# Patient Record
Sex: MALE | Race: WHITE | Employment: FULL TIME | URBAN - METROPOLITAN AREA
[De-identification: names, ages, dates, MRNs, and addresses within clinical notes are randomized per-mention and may not be internally consistent; named-entity substitution may affect disease eponyms.]

---

## 2022-12-13 ENCOUNTER — TELEPHONE (OUTPATIENT)
Dept: INTERNAL MEDICINE CLINIC | Age: 57
End: 2022-12-13

## 2022-12-13 DIAGNOSIS — Z00.00 PHYSICAL EXAM: Primary | ICD-10-CM

## 2023-01-14 PROBLEM — N52.9 ED (ERECTILE DYSFUNCTION): Status: ACTIVE | Noted: 2023-01-14

## 2023-01-14 PROBLEM — K21.9 GERD (GASTROESOPHAGEAL REFLUX DISEASE): Status: ACTIVE | Noted: 2023-01-14

## 2023-01-14 PROBLEM — E78.5 HYPERLIPIDEMIA: Status: ACTIVE | Noted: 2023-01-14

## 2023-01-16 ENCOUNTER — TELEPHONE (OUTPATIENT)
Dept: INTERNAL MEDICINE CLINIC | Age: 58
End: 2023-01-16

## 2023-01-16 ENCOUNTER — APPOINTMENT (OUTPATIENT)
Dept: INTERNAL MEDICINE CLINIC | Age: 58
End: 2023-01-16

## 2023-01-16 LAB
ERYTHROCYTE [DISTWIDTH] IN BLOOD BY AUTOMATED COUNT: 12.9 % (ref 10–15.5)
HCT VFR BLD AUTO: 45.5 % (ref 39.3–51.6)
HGB BLD-MCNC: 14.3 G/DL (ref 13.1–17.2)
MCH RBC QN AUTO: 29 PG (ref 26–34)
MCHC RBC AUTO-ENTMCNC: 31 G/DL (ref 31–36)
MCV RBC AUTO: 92 FL (ref 80–95)
PLATELET # BLD AUTO: 243 K/UL (ref 140–440)
PMV BLD AUTO: 11.8 FL (ref 9–13)
RBC # BLD AUTO: 4.95 M/UL (ref 3.8–5.8)
WBC # BLD AUTO: 9.4 K/UL (ref 4–11)

## 2023-01-16 NOTE — TELEPHONE ENCOUNTER
Spoke with  at Dr. Jodie Rodriguez office she will send medical records. Spoke with Dr. Kristine Oropeza records department. Per Medical Records Rep patient will have to sign a release of information before records will be released. My chart message sent to patient.

## 2023-01-16 NOTE — TELEPHONE ENCOUNTER
----- Message from Shiv Wild MD sent at 1/14/2023 10:27 PM EST -----  Pls get [facility] records dr Vance Quezada get records dr Martell Rucker

## 2023-01-17 LAB
A-G RATIO,AGRAT: 1.6 RATIO (ref 1.1–2.6)
ALBUMIN SERPL-MCNC: 4.5 G/DL (ref 3.5–5)
ALP SERPL-CCNC: 84 U/L (ref 25–115)
ALT SERPL-CCNC: 17 U/L (ref 5–40)
ANION GAP SERPL CALC-SCNC: 12 MMOL/L (ref 3–15)
AST SERPL W P-5'-P-CCNC: 19 U/L (ref 10–37)
AVG GLU, 10930: 113 MG/DL (ref 91–123)
BILIRUB SERPL-MCNC: 0.8 MG/DL (ref 0.2–1.2)
BUN SERPL-MCNC: 16 MG/DL (ref 6–22)
CALCIUM SERPL-MCNC: 9.6 MG/DL (ref 8.4–10.5)
CHLORIDE SERPL-SCNC: 100 MMOL/L (ref 98–110)
CHOLEST SERPL-MCNC: 141 MG/DL (ref 110–200)
CO2 SERPL-SCNC: 27 MMOL/L (ref 20–32)
CREAT SERPL-MCNC: 1.2 MG/DL (ref 0.5–1.2)
GLOBULIN,GLOB: 2.9 G/DL (ref 2–4)
GLOMERULAR FILTRATION RATE: >60 ML/MIN/1.73 SQ.M.
GLUCOSE SERPL-MCNC: 93 MG/DL (ref 70–99)
HBA1C MFR BLD HPLC: 5.6 % (ref 4.8–5.6)
HDLC SERPL-MCNC: 3.7 MG/DL (ref 0–5)
HDLC SERPL-MCNC: 38 MG/DL
LDL/HDL RATIO,LDHD: 2
LDLC SERPL CALC-MCNC: 76 MG/DL (ref 50–99)
NON-HDL CHOLESTEROL, 011976: 103 MG/DL
POTASSIUM SERPL-SCNC: 4.1 MMOL/L (ref 3.5–5.5)
PROT SERPL-MCNC: 7.4 G/DL (ref 6.4–8.3)
PSA SERPL-MCNC: 2.27 NG/ML
SODIUM SERPL-SCNC: 139 MMOL/L (ref 133–145)
TRIGL SERPL-MCNC: 134 MG/DL (ref 40–149)
VLDLC SERPL CALC-MCNC: 27 MG/DL (ref 8–30)

## 2023-01-19 PROBLEM — F98.8 ADD (ATTENTION DEFICIT DISORDER): Status: ACTIVE | Noted: 2023-01-19

## 2023-01-19 PROBLEM — I10 PRIMARY HYPERTENSION: Status: ACTIVE | Noted: 2023-01-19

## 2023-01-19 PROBLEM — J30.9 ALLERGIC RHINITIS: Status: RESOLVED | Noted: 2023-01-19 | Resolved: 2023-01-19

## 2023-01-19 PROBLEM — J30.9 ALLERGIC RHINITIS: Status: ACTIVE | Noted: 2023-01-19

## 2023-01-19 PROBLEM — F98.8 ADD (ATTENTION DEFICIT DISORDER): Status: RESOLVED | Noted: 2023-01-19 | Resolved: 2023-01-19

## 2023-01-19 PROBLEM — E78.5 DYSLIPIDEMIA: Status: ACTIVE | Noted: 2023-01-14

## 2023-01-20 NOTE — PROGRESS NOTES
59 y.o. male who presents for evaluation. He is transferring care from Dr Gonzales [facility]. Records obtained and reviewed from several sources, chart updated    No cardiovascular complaints. Remains active with swimming, biking, weights, golfing. No gi or gu issues    Past Medical History:   Diagnosis Date    Acquired bilateral renal cysts     ADD (attention deficit disorder)     Allergic rhinitis     Colon adenomas 07/2014    and hyperplastic Dr Garrison Fung    Dyslipidemia     ED (erectile dysfunction)     GERD (gastroesophageal reflux disease) 2003    egd Dr Garrison Fung    H/O cardiovascular stress test     NST, low risk. EF 59%.  (7/16) Dr Stanislav Gonzalez    Hypertension     Insomnia     PLMD (periodic limb movement disorder)     [facility] 12[facility]2014     Past Surgical History:   Procedure Laterality Date    HX COLONOSCOPY      Dr Garrison Fung (12/5/14) adenoma and ileitis; (9/11/20) neg    HX GI      Dr Debo Menjivar (2003); (2/15); (9/20)    HX HEENT  2003    mandible osteotomy [facility]    HX ORTHOPAEDIC      DEXA t score 0.3 heel (2009)     Social History     Socioeconomic History    Marital status:      Spouse name: Not on file    Number of children: 3    Years of education: Not on file    Highest education level: Not on file   Occupational History    Occupation: orthodontist   Tobacco Use    Smoking status: Never    Smokeless tobacco: Former   Substance and Sexual Activity    Alcohol use: Yes     Comment: social/wine    Drug use: No    Sexual activity: Not on file   Other Topics Concern    Not on file   Social History Narrative    Not on file     Social Determinants of Health     Financial Resource Strain: Not on file   Food Insecurity: Not on file   Transportation Needs: Not on file   Physical Activity: Not on file   Stress: Not on file   Social Connections: Not on file   Intimate Partner Violence: Not on file   Housing Stability: Not on file     Family History   Problem Relation Age of Onset Cancer Mother         lung    Cancer Father         prostate    Heart Disease Father         CABG    Heart Surgery Father         [address] (age 39/50)     Immunization History   Administered Date(s) Administered    Influenza Vaccine 11/30/2012, 10/07/2016, 09/08/2017    Tdap 06/27/2009    Zoster Vaccine, Live 05/30/2014     There were no vitals taken for this visit. A&O x3  Affect is appropriate. Mood stable  No apparent distress  Anicteric, no JVD, adenopathy or thyromegaly. No carotid bruits or radiated murmur  Lungs clear to auscultation, no wheezes or rales  Heart showed regular rate and rhythm. No murmur, rubs, gallops  Abdomen soft nontender, no hepatosplenomegaly or masses. Extremities without edema. Pulses 1-2+ symmetrically    LABS  From 3/18 showed glu 94, cr 1.23, gfr 66, alt 20, hba1c 5.4, chol 146, tg 132, hdl 37, ldl-c 82, wbc 8.4, hb 15.2, plt 259, psa 1.40, vit d 32.4, ua neg, tsh 1.75, test 456, uric 7.8    Results for orders placed or performed in visit on 01/16/23   LIPID PANEL   Result Value Ref Range    Triglyceride 134 40 - 149 mg/dL    HDL Cholesterol 38 (L) >=40 mg/dL    Cholesterol, total 141 110 - 200 mg/dL    CHOLESTEROL/HDL 3.7 0.0 - 5.0    Non-HDL Cholesterol 103 <130 mg/dL    LDL, calculated 76 50 - 99 mg/dL    VLDL, calculated 27 8 - 30 mg/dL    LDL/HDL Ratio 2.0    METABOLIC PANEL, COMPREHENSIVE   Result Value Ref Range    Glucose 93 70 - 99 mg/dL    BUN 16 6 - 22 mg/dL    Creatinine 1.2 0.5 - 1.2 mg/dL    Sodium 139 133 - 145 mmol/L    Potassium 4.1 3.5 - 5.5 mmol/L    Chloride 100 98 - 110 mmol/L    CO2 27 20 - 32 mmol/L    AST (SGOT) 19 10 - 37 U/L    ALT (SGPT) 17 5 - 40 U/L    Alk.  phosphatase 84 25 - 115 U/L    Bilirubin, total 0.8 0.2 - 1.2 mg/dL    Calcium 9.6 8.4 - 10.5 mg/dL    Protein, total 7.4 6.4 - 8.3 g/dL    Albumin 4.5 3.5 - 5.0 g/dL    A-G Ratio 1.6 1.1 - 2.6 ratio    Globulin 2.9 2.0 - 4.0 g/dL    GLOMERULAR FILTRATION RATE >60.0 >60.0 mL/min/1.73 sq.m.    Anion gap 12.0 3.0 - 15.0 mmol/L   PSA SCREENING (SCREENING)   Result Value Ref Range    Prostate Specific Ag 2.270 <=3.100 ng/mL   CBC W/O DIFF   Result Value Ref Range    WBC 9.4 4.0 - 11.0 K/uL    RBC 4.95 3.80 - 5.80 M/uL    HGB 14.3 13.1 - 17.2 g/dL    HCT 45.5 39.3 - 51.6 %    MCV 92 80 - 95 fL    MCH 29 26 - 34 pg    MCHC 31 31 - 36 g/dL    RDW 12.9 10.0 - 15.5 %    PLATELET 868 779 - 546 K/uL    MPV 11.8 9.0 - 13.0 fL   HEMOGLOBIN A1C W/O EAG   Result Value Ref Range    Hemoglobin A1c 5.6 4.8 - 5.6 %    AVG  91 - 123 mg/dL 9.4 4.0 - 11.0 K/uL    RBC 4.95 3.80 - 5.80 M/uL    HGB 14.3 13.1 - 17.2 g/dL    HCT 45.5 39.3 - 51.6 %    MCV 92 80 - 95 fL    MCH 29 26 - 34 pg    MCHC 31 31 - 36 g/dL    RDW 12.9 10.0 - 15.5 %    PLATELET 857 168 - 125 K/uL    MPV 11.8 9.0 - 13.0 fL   HEMOGLOBIN A1C W/O EAG   Result Value Ref Range    Hemoglobin A1c 5.6 4.8 - 5.6 %    AVG  91 - 123 mg/dL     We reviewed the patient's labs from the last several visits to point out trends in the numbers    Patient Active Problem List   Diagnosis Code    ED (erectile dysfunction) N52.9    GERD (gastroesophageal reflux disease) K21.9    Dyslipidemia E78.5    Primary hypertension I10    Hypothyroidism E03.9    BPH (benign prostatic hyperplasia) N40.0    IGT (impaired glucose tolerance) R73.02    YAZAN (obstructive sleep apnea) G47.33    Overweight (BMI 25.0-29. 9) E66.3     Assessment and plan:  1.  ED. Prn pde5i  2. GERD. Ppi and avoidance measures  3. Dyslipidemia  Lifestyle and dietary measures. Continue statin  4. Hypertension. Continue current regimen. 5.  Hypothyroidism. Will get records Dr Starla Denver, continue replacement  6. BPH and h/o elevated psa. Per NP [facility]  7. IGT. Lifestyle and dietary measures. Portion control reiterated and wt loss as he is trying to do  8. YAZAN  dental appliance and follow up Dr Tomasa Valencia  9. Overweight. As above      RTC 1/24    Instructions above were handwritten on the lab results sheet that I gave the patient today    Above conditions discussed at length and patient vocalized understanding. All questions answered to patient satisfaction        ICD-10-CM ICD-9-CM    1. Dyslipidemia  E78.5 272.4 LIPID PANEL      2. Erectile dysfunction, unspecified erectile dysfunction type  N52.9 607.84 tadalafiL (CIALIS) 20 mg tablet      3. Gastroesophageal reflux disease without esophagitis  K21.9 530.81 pantoprazole (PROTONIX) 20 mg tablet      4. Primary hypertension  I10 401.9 CBC W/O DIFF      5.  Acquired hypothyroidism E03.9 244.9 TSH 3RD GENERATION      T4, FREE      6. Benign prostatic hyperplasia without lower urinary tract symptoms  N40.0 600.00       7. IGT (impaired glucose tolerance)  P22.52 503.42 METABOLIC PANEL, COMPREHENSIVE      HEMOGLOBIN A1C WITH EAG      8. YAZAN (obstructive sleep apnea)  G47.33 327.23       9. Overweight (BMI 25.0-29. 9)  E66.3 278.02

## 2023-01-24 NOTE — PROGRESS NOTES
Dany Rosario presents today for   Chief Complaint   Patient presents with    New Patient    Labs     1-16-23     1. \"Have you been to the ER, urgent care clinic since your last visit? Hospitalized since your last visit? \" no    2. \"Have you seen or consulted any other health care providers outside of the [address] [place] [address] since your last visit? \" no     3. For patients aged 39-70: Has the patient had a colonoscopy / FIT/ Cologuard? Yes - no Care Gap present      If the patient is female:    4. For patients aged 41-77: Has the patient had a mammogram within the past 2 years? NA - based on age or sex  See top three    5. For patients aged 21-65: Has the patient had a pap smear?  NA - based on age or sex

## 2023-01-27 ENCOUNTER — TELEPHONE (OUTPATIENT)
Dept: INTERNAL MEDICINE CLINIC | Age: 58
End: 2023-01-27

## 2023-01-27 ENCOUNTER — OFFICE VISIT (OUTPATIENT)
Dept: INTERNAL MEDICINE CLINIC | Age: 58
End: 2023-01-27
Payer: COMMERCIAL

## 2023-01-27 VITALS
RESPIRATION RATE: 19 BRPM | WEIGHT: 195 LBS | HEART RATE: 63 BPM | SYSTOLIC BLOOD PRESSURE: 136 MMHG | DIASTOLIC BLOOD PRESSURE: 81 MMHG | BODY MASS INDEX: 27.3 KG/M2 | HEIGHT: 71 IN | TEMPERATURE: 97.1 F | OXYGEN SATURATION: 99 %

## 2023-01-27 DIAGNOSIS — N40.0 BENIGN PROSTATIC HYPERPLASIA WITHOUT LOWER URINARY TRACT SYMPTOMS: ICD-10-CM

## 2023-01-27 DIAGNOSIS — G47.33 OSA (OBSTRUCTIVE SLEEP APNEA): ICD-10-CM

## 2023-01-27 DIAGNOSIS — R73.02 IGT (IMPAIRED GLUCOSE TOLERANCE): ICD-10-CM

## 2023-01-27 DIAGNOSIS — E66.3 OVERWEIGHT (BMI 25.0-29.9): ICD-10-CM

## 2023-01-27 DIAGNOSIS — N52.9 ERECTILE DYSFUNCTION, UNSPECIFIED ERECTILE DYSFUNCTION TYPE: ICD-10-CM

## 2023-01-27 DIAGNOSIS — K21.9 GASTROESOPHAGEAL REFLUX DISEASE WITHOUT ESOPHAGITIS: ICD-10-CM

## 2023-01-27 DIAGNOSIS — E78.5 DYSLIPIDEMIA: ICD-10-CM

## 2023-01-27 DIAGNOSIS — E03.9 ACQUIRED HYPOTHYROIDISM: ICD-10-CM

## 2023-01-27 DIAGNOSIS — I10 PRIMARY HYPERTENSION: ICD-10-CM

## 2023-01-27 PROCEDURE — 3074F SYST BP LT 130 MM HG: CPT | Performed by: INTERNAL MEDICINE

## 2023-01-27 PROCEDURE — 99205 OFFICE O/P NEW HI 60 MIN: CPT | Performed by: INTERNAL MEDICINE

## 2023-01-27 PROCEDURE — 3078F DIAST BP <80 MM HG: CPT | Performed by: INTERNAL MEDICINE

## 2023-01-27 RX ORDER — ATORVASTATIN CALCIUM 20 MG/1
20 TABLET, FILM COATED ORAL
COMMUNITY
Start: 2023-01-12 | End: 2023-01-27 | Stop reason: SDUPTHER

## 2023-01-27 RX ORDER — HYDROCHLOROTHIAZIDE 12.5 MG/1
12.5 TABLET ORAL DAILY
COMMUNITY
Start: 2023-01-09 | End: 2023-01-27 | Stop reason: SDUPTHER

## 2023-01-27 RX ORDER — HYDROCHLOROTHIAZIDE 12.5 MG/1
12.5 TABLET ORAL DAILY
Qty: 90 TABLET | Refills: 3 | Status: SHIPPED | OUTPATIENT
Start: 2023-01-27

## 2023-01-27 RX ORDER — ATORVASTATIN CALCIUM 20 MG/1
20 TABLET, FILM COATED ORAL
Qty: 90 TABLET | Refills: 3 | Status: SHIPPED | OUTPATIENT
Start: 2023-01-27

## 2023-01-27 RX ORDER — TADALAFIL 20 MG/1
10 TABLET ORAL AS NEEDED
Qty: 30 TABLET | Refills: 3 | Status: SHIPPED | OUTPATIENT
Start: 2023-01-27

## 2023-01-27 RX ORDER — PANTOPRAZOLE SODIUM 20 MG/1
20 TABLET, DELAYED RELEASE ORAL DAILY
Qty: 90 TABLET | Refills: 3 | Status: SHIPPED | OUTPATIENT
Start: 2023-01-27

## 2023-01-27 RX ORDER — LEVOTHYROXINE AND LIOTHYRONINE 9.5; 2.25 UG/1; UG/1
TABLET ORAL
COMMUNITY
End: 2023-01-27 | Stop reason: SDUPTHER

## 2023-01-27 RX ORDER — LEVOTHYROXINE AND LIOTHYRONINE 9.5; 2.25 UG/1; UG/1
15 TABLET ORAL DAILY
Qty: 90 TABLET | Refills: 3 | Status: SHIPPED | OUTPATIENT
Start: 2023-01-27

## 2023-01-27 RX ORDER — ASPIRIN 81 MG/1
81 TABLET ORAL DAILY
COMMUNITY
End: 2023-01-30 | Stop reason: ALTCHOICE

## 2023-01-27 NOTE — TELEPHONE ENCOUNTER
----- Message from Kim Zendejas MD sent at 1/27/2023 10:55 AM EST -----  Pls get records dr Celio Marcelino long - hormone treatments

## 2023-01-27 NOTE — TELEPHONE ENCOUNTER
----- Message from Jorge Hoff MD sent at 1/27/2023 10:57 AM EST -----  Pls get records dr kristie pastor/sleep apnea

## 2023-01-30 DIAGNOSIS — R73.02 IGT (IMPAIRED GLUCOSE TOLERANCE): ICD-10-CM

## 2023-01-30 PROBLEM — G47.33 OSA (OBSTRUCTIVE SLEEP APNEA): Status: ACTIVE | Noted: 2023-01-30

## 2023-01-30 PROBLEM — E66.3 OVERWEIGHT (BMI 25.0-29.9): Status: ACTIVE | Noted: 2023-01-30

## 2023-01-30 PROBLEM — N40.0 BPH (BENIGN PROSTATIC HYPERPLASIA): Status: ACTIVE | Noted: 2022-01-01

## 2023-02-05 DIAGNOSIS — R73.02 IGT (IMPAIRED GLUCOSE TOLERANCE): Primary | ICD-10-CM

## 2023-02-05 DIAGNOSIS — I10 PRIMARY HYPERTENSION: Primary | ICD-10-CM

## 2023-02-05 DIAGNOSIS — E03.9 ACQUIRED HYPOTHYROIDISM: Primary | ICD-10-CM

## 2023-02-07 DIAGNOSIS — E03.9 ACQUIRED HYPOTHYROIDISM: Primary | ICD-10-CM

## 2023-02-07 DIAGNOSIS — E78.5 DYSLIPIDEMIA: Primary | ICD-10-CM

## 2023-07-30 DIAGNOSIS — R73.02 IGT (IMPAIRED GLUCOSE TOLERANCE): ICD-10-CM

## 2023-07-30 DIAGNOSIS — E03.9 ACQUIRED HYPOTHYROIDISM: ICD-10-CM

## 2023-07-30 DIAGNOSIS — E78.5 DYSLIPIDEMIA: ICD-10-CM

## 2023-07-30 DIAGNOSIS — I10 PRIMARY HYPERTENSION: ICD-10-CM

## 2023-08-01 DIAGNOSIS — E03.9 ACQUIRED HYPOTHYROIDISM: ICD-10-CM

## 2023-08-09 ENCOUNTER — TELEPHONE (OUTPATIENT)
Age: 58
End: 2023-08-09

## 2023-08-09 NOTE — TELEPHONE ENCOUNTER
Dr. Vinny Rai called and needs to schedule an ED follow up and unfortunately with his schedule he states Fridays were work better to be seen. No available appts. Please Advise.

## 2023-08-11 ENCOUNTER — HOSPITAL ENCOUNTER (OUTPATIENT)
Facility: HOSPITAL | Age: 58
Discharge: HOME | End: 2023-08-14

## 2023-08-11 ENCOUNTER — OFFICE VISIT (OUTPATIENT)
Age: 58
End: 2023-08-11
Payer: COMMERCIAL

## 2023-08-11 VITALS
DIASTOLIC BLOOD PRESSURE: 84 MMHG | HEART RATE: 72 BPM | HEIGHT: 71 IN | OXYGEN SATURATION: 97 % | BODY MASS INDEX: 27.13 KG/M2 | SYSTOLIC BLOOD PRESSURE: 131 MMHG | TEMPERATURE: 98.2 F | WEIGHT: 193.8 LBS | RESPIRATION RATE: 16 BRPM

## 2023-08-11 DIAGNOSIS — K56.609 SBO (SMALL BOWEL OBSTRUCTION) (HCC): ICD-10-CM

## 2023-08-11 DIAGNOSIS — R10.9 ABDOMINAL PAIN, UNSPECIFIED ABDOMINAL LOCATION: Primary | ICD-10-CM

## 2023-08-11 DIAGNOSIS — R11.2 NAUSEA AND VOMITING, UNSPECIFIED VOMITING TYPE: ICD-10-CM

## 2023-08-11 LAB — SENTARA SPECIMEN COLLECTION: NORMAL

## 2023-08-11 PROCEDURE — 99214 OFFICE O/P EST MOD 30 MIN: CPT | Performed by: INTERNAL MEDICINE

## 2023-08-11 PROCEDURE — 3079F DIAST BP 80-89 MM HG: CPT | Performed by: INTERNAL MEDICINE

## 2023-08-11 PROCEDURE — 3075F SYST BP GE 130 - 139MM HG: CPT | Performed by: INTERNAL MEDICINE

## 2023-08-11 RX ORDER — HYDROCHLOROTHIAZIDE 12.5 MG/1
12.5 TABLET ORAL DAILY
COMMUNITY
Start: 2023-05-30

## 2023-08-11 RX ORDER — ATORVASTATIN CALCIUM 20 MG/1
TABLET, FILM COATED ORAL
COMMUNITY
Start: 2023-07-21

## 2023-08-11 RX ORDER — THYROID 15 MG/1
15 TABLET ORAL DAILY
COMMUNITY
Start: 2023-01-27

## 2023-08-11 RX ORDER — PANTOPRAZOLE SODIUM 20 MG/1
20 TABLET, DELAYED RELEASE ORAL DAILY
COMMUNITY
Start: 2023-07-21

## 2023-08-11 RX ORDER — TADALAFIL 20 MG/1
10 TABLET ORAL PRN
COMMUNITY
Start: 2019-03-01

## 2023-08-11 SDOH — ECONOMIC STABILITY: HOUSING INSECURITY
IN THE LAST 12 MONTHS, WAS THERE A TIME WHEN YOU DID NOT HAVE A STEADY PLACE TO SLEEP OR SLEPT IN A SHELTER (INCLUDING NOW)?: NO

## 2023-08-11 SDOH — ECONOMIC STABILITY: FOOD INSECURITY: WITHIN THE PAST 12 MONTHS, YOU WORRIED THAT YOUR FOOD WOULD RUN OUT BEFORE YOU GOT MONEY TO BUY MORE.: NEVER TRUE

## 2023-08-11 SDOH — ECONOMIC STABILITY: INCOME INSECURITY: HOW HARD IS IT FOR YOU TO PAY FOR THE VERY BASICS LIKE FOOD, HOUSING, MEDICAL CARE, AND HEATING?: NOT HARD AT ALL

## 2023-08-11 SDOH — ECONOMIC STABILITY: FOOD INSECURITY: WITHIN THE PAST 12 MONTHS, THE FOOD YOU BOUGHT JUST DIDN'T LAST AND YOU DIDN'T HAVE MONEY TO GET MORE.: NEVER TRUE

## 2023-08-11 NOTE — PROGRESS NOTES
Katharine Mike presents today for   Chief Complaint   Patient presents with    Follow-up     [facility] 8/8 Vomiting/abdominal pain                 1. \"Have you been to the ER, urgent care clinic since your last visit? Hospitalized since your last visit? \" yes    2. \"Have you seen or consulted any other health care providers outside of the [address] [place] [address] since your last visit? \" no     3. For patients aged 43-73: Has the patient had a colonoscopy / FIT/ Cologuard? Yes - no Care Gap present      If the patient is female:    4. For patients aged 43-66: Has the patient had a mammogram within the past 2 years? NA - based on age or sex      11. For patients aged 21-65: Has the patient had a pap smear?  NA - based on age or sex

## 2023-08-11 NOTE — PROGRESS NOTES
59 y.o. male who presents for evaluation. Here to follow up after his [facility] visit 8/8/23. He was doing well on the morning of 8/7/2023; had lunch at [facility] express and then had a steak dinner cooked by his daughter. Around 9 PM, he started having mostly epigastric pain followed by nausea and vomiting which continued all through the night. Mostly bilious, \"all of his food came out\". No BMs but he was passing gas. The following morning, the pain was slightly better, he started having some diarrhea. Because sx persisted, he elected to go to the ER where his white count was elevated, likely dehydrated, some other electrolyte disturbances noted. Lipase was normal.  CT showed fluid-filled bowel extending from proximal to mid small bowel throughout colon without any formed stool, possible enterocolitis, several areas of transition from the small bowel which could represent peristalsis but would be difficult to exclude strictures in the setting of IBD. This was thought to be less likely. Of note, he was previously followed by Dr. Ayanna Parr and is known to have colon adenomas and at least 1 colonoscopy showed mild ileitis as below although he's never been called IBD. He's apparently had repeated episodes over the years very similar to this but not to this severity, was told it's from 'narrowings in his gut'. Since the ER visit, he has felt a lot better, eating normal foods as of yesterday and having formed BMs. Still some soreness but he thinks it's more the muscles from the vomiting.   He was sent home on Augmentin which he never took    Past Medical History:   Diagnosis Date    ADD (attention deficit disorder)     Allergic rhinitis     BPH (benign prostatic hyperplasia) 2022    NP [facility]    Colon adenomas 07/2014    and hyperplastic Dr Ayanna Parr    Dyslipidemia     ED (erectile dysfunction)     Elevated PSA 2022    NP [facility]/[place]    FHx: prostate cancer     GERD (gastroesophageal reflux disease)

## 2023-08-12 LAB
A/G RATIO: 1.6 RATIO (ref 1.1–2.6)
ALBUMIN SERPL-MCNC: 4.3 G/DL (ref 3.5–5)
ALP BLD-CCNC: 94 U/L (ref 25–115)
ALT SERPL-CCNC: 21 U/L (ref 5–40)
ANION GAP SERPL CALCULATED.3IONS-SCNC: 11 MMOL/L (ref 3–15)
AST SERPL-CCNC: 20 U/L (ref 10–37)
BASOPHILS # BLD: 1 % (ref 0–2)
BASOPHILS ABSOLUTE: 0.1 K/UL (ref 0–0.2)
BILIRUB SERPL-MCNC: 0.5 MG/DL (ref 0.2–1.2)
BUN BLDV-MCNC: 19 MG/DL (ref 6–22)
CALCIUM SERPL-MCNC: 9.3 MG/DL (ref 8.4–10.5)
CHLORIDE BLD-SCNC: 101 MMOL/L (ref 98–110)
CO2: 28 MMOL/L (ref 20–32)
CREAT SERPL-MCNC: 1.1 MG/DL (ref 0.5–1.2)
EOSINOPHIL # BLD: 3 % (ref 0–6)
EOSINOPHILS ABSOLUTE: 0.3 K/UL (ref 0–0.5)
GLOBULIN: 2.7 G/DL (ref 2–4)
GLOMERULAR FILTRATION RATE: >60 ML/MIN/1.73 SQ.M.
GLUCOSE: 95 MG/DL (ref 70–99)
HCT VFR BLD CALC: 46.4 % (ref 39.3–51.6)
HEMOGLOBIN: 13.8 G/DL (ref 13.1–17.2)
LYMPHOCYTES # BLD: 21 % (ref 20–45)
LYMPHOCYTES ABSOLUTE: 2.2 K/UL (ref 1–4.8)
MCH RBC QN AUTO: 29 PG (ref 26–34)
MCHC RBC AUTO-ENTMCNC: 30 G/DL (ref 31–36)
MCV RBC AUTO: 98 FL (ref 80–95)
MONOCYTES ABSOLUTE: 1 K/UL (ref 0.1–1)
MONOCYTES: 10 % (ref 3–12)
NEUTROPHILS ABSOLUTE: 6.7 K/UL (ref 1.8–7.7)
NEUTROPHILS: 65 % (ref 40–75)
PDW BLD-RTO: 13.5 % (ref 10–15.5)
PLATELET # BLD: 246 K/UL (ref 140–440)
PMV BLD AUTO: 12.3 FL (ref 9–13)
POTASSIUM SERPL-SCNC: 4.6 MMOL/L (ref 3.5–5.5)
RBC: 4.76 M/UL (ref 3.8–5.8)
SODIUM BLD-SCNC: 140 MMOL/L (ref 133–145)
TOTAL PROTEIN: 7 G/DL (ref 6.4–8.3)
WBC: 10.2 K/UL (ref 4–11)

## 2023-11-22 RX ORDER — THYROID,PORK 15 MG
15 TABLET ORAL DAILY
Qty: 90 TABLET | Refills: 2 | Status: SHIPPED | OUTPATIENT
Start: 2023-11-22

## 2023-12-26 DIAGNOSIS — R10.9 ABDOMINAL PAIN, UNSPECIFIED ABDOMINAL LOCATION: ICD-10-CM

## 2023-12-26 PROCEDURE — 36415 COLL VENOUS BLD VENIPUNCTURE: CPT | Performed by: INTERNAL MEDICINE

## 2024-01-04 ENCOUNTER — TELEPHONE (OUTPATIENT)
Age: 59
End: 2024-01-04

## 2024-01-04 NOTE — TELEPHONE ENCOUNTER
Pt called and stated that he has had a cough for three weeks and has been to the ED Pt had xrays taken and no pneumonia. Tested negative for Covid  Pt is taking benzonatate, guaiFENesin ER, albuterol with no relief.     [phone]     Pt would like to be seen by any provider. PCP didn't have available appointment that meets pt needs

## 2024-01-18 ENCOUNTER — OFFICE VISIT (OUTPATIENT)
Age: 59
End: 2024-01-18
Payer: COMMERCIAL

## 2024-01-18 VITALS
SYSTOLIC BLOOD PRESSURE: 119 MMHG | OXYGEN SATURATION: 99 % | DIASTOLIC BLOOD PRESSURE: 83 MMHG | WEIGHT: 192 LBS | BODY MASS INDEX: 26.88 KG/M2 | HEIGHT: 71 IN | HEART RATE: 92 BPM | RESPIRATION RATE: 16 BRPM | TEMPERATURE: 98.4 F

## 2024-01-18 DIAGNOSIS — J30.2 SEASONAL ALLERGIC RHINITIS, UNSPECIFIED TRIGGER: ICD-10-CM

## 2024-01-18 DIAGNOSIS — R06.2 WHEEZING: ICD-10-CM

## 2024-01-18 DIAGNOSIS — M79.10 MYALGIA: ICD-10-CM

## 2024-01-18 DIAGNOSIS — E03.9 HYPOTHYROIDISM, UNSPECIFIED TYPE: ICD-10-CM

## 2024-01-18 DIAGNOSIS — I10 PRIMARY HYPERTENSION: ICD-10-CM

## 2024-01-18 DIAGNOSIS — J40 BRONCHITIS: ICD-10-CM

## 2024-01-18 DIAGNOSIS — K21.9 GASTROESOPHAGEAL REFLUX DISEASE, UNSPECIFIED WHETHER ESOPHAGITIS PRESENT: ICD-10-CM

## 2024-01-18 DIAGNOSIS — R05.9 COUGH, UNSPECIFIED TYPE: Primary | ICD-10-CM

## 2024-01-18 PROCEDURE — 99214 OFFICE O/P EST MOD 30 MIN: CPT | Performed by: INTERNAL MEDICINE

## 2024-01-18 PROCEDURE — 3074F SYST BP LT 130 MM HG: CPT | Performed by: INTERNAL MEDICINE

## 2024-01-18 PROCEDURE — 3079F DIAST BP 80-89 MM HG: CPT | Performed by: INTERNAL MEDICINE

## 2024-01-18 RX ORDER — HYDROCHLOROTHIAZIDE 12.5 MG/1
12.5 TABLET ORAL DAILY
Qty: 90 TABLET | Refills: 3 | Status: SHIPPED | OUTPATIENT
Start: 2024-01-18

## 2024-01-18 RX ORDER — FLUTICASONE PROPIONATE 50 MCG
1 SPRAY, SUSPENSION (ML) NASAL DAILY
Qty: 32 G | Refills: 1 | Status: SHIPPED | OUTPATIENT
Start: 2024-01-18

## 2024-01-18 RX ORDER — PANTOPRAZOLE SODIUM 20 MG/1
20 TABLET, DELAYED RELEASE ORAL DAILY
Qty: 90 TABLET | Refills: 3 | Status: SHIPPED | OUTPATIENT
Start: 2024-01-18

## 2024-01-18 RX ORDER — FLUTICASONE FUROATE AND VILANTEROL TRIFENATATE 50; 25 UG/1; UG/1
1 POWDER RESPIRATORY (INHALATION) DAILY
Qty: 1 EACH | Refills: 1 | Status: SHIPPED | OUTPATIENT
Start: 2024-01-18

## 2024-01-18 RX ORDER — THYROID 15 MG/1
15 TABLET ORAL DAILY
Qty: 90 TABLET | Refills: 3 | Status: SHIPPED | OUTPATIENT
Start: 2024-01-18

## 2024-01-18 NOTE — PROGRESS NOTES
58 y.o. male who presents for evaluation.    He is here to follow-up during his ER visit.  He noted onset of cough and upper respiratory symptoms on 12/19/23.  Did not get better with OTC meds with mild ended up in [facility] ER on 12/26/23.  Chest x-ray negative, flu, RSV and COVID-negative, they gave him Tessalon and an inhaler.  He came back on 12/29/2023 complaining of incessant coughing and repeat chest x-ray was negative.  He was sent home on Medrol Dosepak.  This \"completely cleared\" his coughing but once it tapered off, his cough has come back.  He does have history of allergies, uses antihistamines as needed, not on nasal steroids.  The cough is usually just in the daytime, dry and nonproductive, no associated wheezing or dyspnea.    Secondly, he is concerned about his statin causing him some cramping and aches and pains.  He stopped it and would like some blood work checked.    Past Medical History:   Diagnosis Date    ADD (attention deficit disorder)     Allergic rhinitis     BPH (benign prostatic hyperplasia) 2022    NP [facility]    Colon adenomas 07/2014    and hyperplastic Dr Brian    Dyslipidemia     ED (erectile dysfunction)     Elevated PSA 2022    NP [facility]/[place]    FHx: prostate cancer     GERD (gastroesophageal reflux disease) 2003    egd Dr Brian    H/O cardiovascular stress test     NST, low risk. EF 59%. (7/16) Dr Avilez    Hypertension     Hypothyroidism     Dr Ruma Fong on [place] thyroid    IGT (impaired glucose tolerance) 08/2021    Insomnia     DONOVAN (obstructive sleep apnea)     Dr RAKESH Josue; PLMD; intol mask, currently using dental applaince    Overweight (BMI 25.0-29.9)     Phymatous rosacea 01/2024    Renal cysts, acquired, bilateral     SBO (small bowel obstruction) ([facility]) 12/2014     Current Outpatient Medications   Medication Sig    thyroid ([place]stone THYROID) 15 MG tablet Take 1 tablet by mouth daily    hydroCHLOROthiazide (HYDRODIURIL) 12.5 MG tablet Take 1 tablet by mouth daily

## 2024-01-18 NOTE — PROGRESS NOTES
Torin Larisa presents today for   Chief Complaint   Patient presents with    Cough     > 3 weeks    Spasms       1. \"Have you been to the ER, urgent care clinic since your last visit?  Hospitalized since your last visit?\" Yes    2. \"Have you seen or consulted any other health care providers outside of the [facility] since your last visit?\" Yes     3. For patients aged 45-75: Has the patient had a colonoscopy / FIT/ Cologuard? Yes - no Care Gap present      If the patient is female:    4. For patients aged 40-74: Has the patient had a mammogram within the past 2 years? NA - based on age or sex      5. For patients aged 21-65: Has the patient had a pap smear? NA - based on age or sex

## 2024-01-19 ENCOUNTER — HOSPITAL ENCOUNTER (OUTPATIENT)
Facility: HOSPITAL | Age: 59
Discharge: HOME | End: 2024-01-22

## 2024-01-19 ENCOUNTER — TELEPHONE (OUTPATIENT)
Age: 59
End: 2024-01-19

## 2024-01-19 DIAGNOSIS — M79.10 MYALGIA: Primary | ICD-10-CM

## 2024-01-19 LAB — SENTARA SPECIMEN COLLECTION: NORMAL

## 2024-01-19 NOTE — TELEPHONE ENCOUNTER
----- Message from Sandra Desouza sent at 1/19/2024 12:01 PM EST -----  Subject: Referral Request    Reason for referral request? patient needs referral to order labs for   urine test to for protein, sees provider Traci , please call when   ordered  Provider patient wants to be referred to(if known):     Provider Phone Number(if known):    Additional Information for Provider?   ---------------------------------------------------------------------------  --------------  CALL BACK INFO    [phone]; OK to leave message on voicemail  ---------------------------------------------------------------------------  --------------  
----- Message from Sandra Desouza sent at 1/19/2024 12:01 PM EST -----  Subject: Referral Request    Reason for referral request? patient needs referral to order labs for   urine test to for protein, sees provider Traci , please call when   ordered  Provider patient wants to be referred to(if known):     Provider Phone Number(if known):    Additional Information for Provider?   ---------------------------------------------------------------------------  --------------  CALL BACK INFO    [phone]; OK to leave message on voicemail  ---------------------------------------------------------------------------  --------------  
Ordered - apologies  
Pt notified that orders are in chart he will go to [facility]  
Past 24 hrs

## 2024-01-24 ENCOUNTER — TELEPHONE (OUTPATIENT)
Age: 59
End: 2024-01-24

## 2024-01-24 LAB
A/G RATIO: 1.3 RATIO (ref 1.1–2.6)
ALBUMIN SERPL-MCNC: 4 G/DL (ref 3.5–5)
ALDOLASE: 8.6 U/L
ALP BLD-CCNC: 98 U/L (ref 40–125)
ALT SERPL-CCNC: 46 U/L (ref 5–40)
ANION GAP SERPL CALCULATED.3IONS-SCNC: 9 MMOL/L (ref 3–15)
AST SERPL-CCNC: 38 U/L (ref 10–37)
ATYPICAL LYMPHOCYTES: 3 %
BILIRUB SERPL-MCNC: 0.7 MG/DL (ref 0.2–1.2)
BUN BLDV-MCNC: 15 MG/DL (ref 6–22)
CALCIUM SERPL-MCNC: 9.1 MG/DL (ref 8.4–10.5)
CHLORIDE BLD-SCNC: 102 MMOL/L (ref 98–110)
CO2: 28 MMOL/L (ref 20–32)
CREAT SERPL-MCNC: 1.2 MG/DL (ref 0.8–1.6)
EOS ABS-DIF: 0.4 K/UL (ref 0–0.5)
EOSINOPHILS C MAN (DIFF): 3 % (ref 0–6)
GLOBULIN: 3.2 G/DL (ref 2–4)
GLOMERULAR FILTRATION RATE: >60 ML/MIN/1.73 SQ.M.
GLUCOSE: 90 MG/DL (ref 70–99)
HCT VFR BLD CALC: 43.1 % (ref 39.3–51.6)
HEMOGLOBIN: 12.8 G/DL (ref 13.1–17.2)
LYMPHOCYTES C MAN (DIFF): 50 % (ref 20–45)
LYMPHS ABS-DIF: 6.4 K/UL (ref 1–4.8)
MCH RBC QN AUTO: 29 PG (ref 26–34)
MCHC RBC AUTO-ENTMCNC: 30 G/DL (ref 31–36)
MCV RBC AUTO: 99 FL (ref 80–95)
MONOCYTES ABS-DIF: 1.3 K/UL (ref 0.1–1)
MONOCYTES C MAN (DIFF): 11 % (ref 3–12)
NEUTROPHILS ABSOLUTE: 3.9 K/UL (ref 1.8–7.7)
NEUTROPHILS C MAN (DIFF): 33 % (ref 40–75)
NORMOCHROMIC CELLAVISION: ABNORMAL
NORMOCYTIC CELLAVISION: ABNORMAL
PDW BLD-RTO: 16.3 % (ref 10–15.5)
PLATELET # BLD: 318 K/UL (ref 140–440)
PMV BLD AUTO: 10.6 FL (ref 9–13)
POTASSIUM SERPL-SCNC: 5.1 MMOL/L (ref 3.5–5.5)
RBC: 4.37 M/UL (ref 3.8–5.8)
SMEAR REVIEW: ABNORMAL
SODIUM BLD-SCNC: 139 MMOL/L (ref 133–145)
TOTAL CK: 128 U/L (ref 30–200)
TOTAL PROTEIN: 7.2 G/DL (ref 6.2–8.1)
WBC: 11.9 K/UL (ref 4–11)

## 2024-01-24 NOTE — TELEPHONE ENCOUNTER
----- Message from Sarah Russell sent at 1/24/2024  9:46 AM EST -----  Subject: Message to Provider    QUESTIONS  Information for Provider? Patient called asking questions regarding the   appt for Friday what testing was needing done and discuss results.   ---------------------------------------------------------------------------  --------------  CALL BACK INFO  [phone]; OK to leave message on voicemail  ---------------------------------------------------------------------------  --------------  SCRIPT ANSWERS  Relationship to Patient? Self

## 2024-01-24 NOTE — TELEPHONE ENCOUNTER
Called patient and left message for him to return call to clinic; upon return call please relay message from Dr. Aviles.

## 2024-01-24 NOTE — TELEPHONE ENCOUNTER
Pls call    Results for orders placed or performed during the hospital encounter of 01/19/24 (from the past 2160 hour(s))   [facility] specimen collection   Result Value Ref Range    [facility] Specimen Collection Specimens collected/sent to [facility]     Results for orders placed or performed in visit on 01/19/24 (from the past 2160 hour(s))   Aldolase   Result Value Ref Range    Aldolase 8.6 (H) <=8.1 U/L   Comprehensive Metabolic Panel   Result Value Ref Range    Potassium 5.1 3.5 - 5.5 mmol/L    Sodium 139 133 - 145 mmol/L    Chloride 102 98 - 110 mmol/L    Glucose 90 70 - 99 mg/dL    Calcium 9.1 8.4 - 10.5 mg/dL    Albumin 4.0 3.5 - 5.0 g/dL    ALT 46 (H) 5 - 40 U/L    AST 38 (H) 10 - 37 U/L    Total Bilirubin 0.7 0.2 - 1.2 mg/dL    Alkaline Phosphatase 98 40 - 125 U/L    BUN 15 6 - 22 mg/dL    CO2 28 20 - 32 mmol/L    Creatinine 1.2 0.8 - 1.6 mg/dL    Glomerular Filtration Rate >60.0 >60.0 mL/min/1.73 sq.m.    Globulin 3.2 2.0 - 4.0 g/dL    Albumin/Globulin Ratio 1.3 1.1 - 2.6 ratio    Total Protein 7.2 6.2 - 8.1 g/dL    Anion Gap 9.0 3.0 - 15.0 mmol/L   CBC with Auto Differential   Result Value Ref Range    WBC 11.9 (H) 4.0 - 11.0 K/uL    RBC 4.37 3.80 - 5.80 M/uL    Hemoglobin 12.8 (L) 13.1 - 17.2 g/dL    Hematocrit 43.1 39.3 - 51.6 %    MCV 99 (H) 80 - 95 fL    MCH 29 26 - 34 pg    MCHC 30 (L) 31 - 36 g/dL    RDW 16.3 (H) 10.0 - 15.5 %    Platelets 318 140 - 440 K/uL    MPV 10.6 9.0 - 13.0 fL   Manual Differential   Result Value Ref Range    Neutrophils C Man (Diff) 33 (L) 40 - 75 %    Lymphocytes C Man (Diff) 50 (H) 20 - 45 %    Monocytes C Man (Diff) 11 3 - 12 %    Eosinophils C Man (Diff) 3 0 - 6 %    Atypical Lymphocytes 3 (H) <=0 %    Neutrophils Absolute 3.9 1.8 - 7.7 K/uL    Lymphs Abs-Dif 6.4 (H) 1.0 - 4.8 K/uL    Monocytes abs-DIF 1.3 (H) 0.1 - 1.0 K/uL    Eos abs-DIF 0.4 0.0 - 0.5 K/uL    Normochromic Cellavision Normochromic     Normocytic Cellavision Normocytic     Smear Review Platelet morphology

## 2024-01-25 ENCOUNTER — TELEPHONE (OUTPATIENT)
Age: 59
End: 2024-01-25

## 2024-01-25 NOTE — TELEPHONE ENCOUNTER
Called patient and relayed message from provider. Patient verbalized understanding and will call back to reschedule next office visit to include labs to be completed the week prior to office visit.    No further action required.

## 2024-01-25 NOTE — TELEPHONE ENCOUNTER
Pt called in regarding lab results pt states he will keep his February appt and be evaluated then to see if symptoms resolve. He is aware Ck ok but aldolase elevated - could be muscle side effect of statin he is not taking the statin currently.Advised him of the elevated lfts but explained it could be from viral illness. Pt aware that we will need to recheck in 3-4 weeks once all his sx clear to see if these labs will normalize

## 2024-01-30 ENCOUNTER — PATIENT MESSAGE (OUTPATIENT)
Age: 59
End: 2024-01-30

## 2024-01-30 DIAGNOSIS — E03.9 HYPOTHYROIDISM, UNSPECIFIED TYPE: Primary | ICD-10-CM

## 2024-01-30 DIAGNOSIS — Z00.00 PHYSICAL EXAM: ICD-10-CM

## 2024-01-30 DIAGNOSIS — R73.02 IGT (IMPAIRED GLUCOSE TOLERANCE): ICD-10-CM

## 2024-01-31 ENCOUNTER — TELEPHONE (OUTPATIENT)
Age: 59
End: 2024-01-31

## 2024-01-31 RX ORDER — FLUTICASONE FUROATE AND VILANTEROL 100; 25 UG/1; UG/1
1 POWDER RESPIRATORY (INHALATION) DAILY
Qty: 30 EACH | Refills: 11 | Status: SHIPPED | OUTPATIENT
Start: 2024-01-31

## 2024-01-31 NOTE — TELEPHONE ENCOUNTER
From: Torin Peres  To: Dr. Jersey Aviles  Sent: 1/30/2024 10:43 AM EST  Subject: Future Blood work    I have moved my physical appointment until March1 as requested. As far as all of the new bloodwork for my yearly physical and to follow up with everything else that was going on, could you send it to me so that I can walk in to get it and not make an appointmet. I realize that I need to take it about a week prior to my appointment. Thanks, Abhijit Peres

## 2024-02-24 LAB
A/G RATIO: 1.4 RATIO (ref 1.1–2.6)
ALBUMIN SERPL-MCNC: 4.3 G/DL (ref 3.5–5)
ALP BLD-CCNC: 86 U/L (ref 40–125)
ALT SERPL-CCNC: 19 U/L (ref 5–40)
ANION GAP SERPL CALCULATED.3IONS-SCNC: 10 MMOL/L (ref 3–15)
AST SERPL-CCNC: 22 U/L (ref 10–37)
BASOPHILS # BLD: 1 % (ref 0–2)
BASOPHILS ABSOLUTE: 0.1 K/UL (ref 0–0.2)
BILIRUB SERPL-MCNC: 0.4 MG/DL (ref 0.2–1.2)
BUN BLDV-MCNC: 21 MG/DL (ref 6–22)
CALCIUM SERPL-MCNC: 9.4 MG/DL (ref 8.4–10.5)
CHLORIDE BLD-SCNC: 105 MMOL/L (ref 98–110)
CHOLESTEROL/HDL RATIO: 5.9 (ref 0–5)
CHOLESTEROL: 195 MG/DL (ref 110–200)
CO2: 25 MMOL/L (ref 20–32)
CREAT SERPL-MCNC: 1.1 MG/DL (ref 0.8–1.6)
EOSINOPHIL # BLD: 4 % (ref 0–6)
EOSINOPHILS ABSOLUTE: 0.4 K/UL (ref 0–0.5)
ESTIMATED AVERAGE GLUCOSE: 106 MG/DL (ref 91–123)
GLOBULIN: 3.1 G/DL (ref 2–4)
GLOMERULAR FILTRATION RATE: >60 ML/MIN/1.73 SQ.M.
GLUCOSE: 100 MG/DL (ref 70–99)
HBA1C MFR BLD: 5.3 % (ref 4.8–5.6)
HCT VFR BLD CALC: 42.2 % (ref 39.3–51.6)
HDLC SERPL-MCNC: 33 MG/DL
HEMOGLOBIN: 13.8 G/DL (ref 13.1–17.2)
LDL CHOLESTEROL CALCULATED: 126 MG/DL (ref 50–99)
LDL/HDL RATIO: 3.8
LYMPHOCYTES # BLD: 43 % (ref 20–45)
LYMPHOCYTES ABSOLUTE: 3.8 K/UL (ref 1–4.8)
MCH RBC QN AUTO: 30 PG (ref 26–34)
MCHC RBC AUTO-ENTMCNC: 33 G/DL (ref 31–36)
MCV RBC AUTO: 91 FL (ref 80–95)
MONOCYTES ABSOLUTE: 1.1 K/UL (ref 0.1–1)
MONOCYTES: 12 % (ref 3–12)
NEUTROPHILS ABSOLUTE: 3.5 K/UL (ref 1.8–7.7)
NEUTROPHILS: 39 % (ref 40–75)
NON-HDL CHOLESTEROL: 162 MG/DL
PDW BLD-RTO: 14.4 % (ref 10–15.5)
PLATELET # BLD: 229 K/UL (ref 140–440)
PMV BLD AUTO: 10.8 FL (ref 9–13)
POTASSIUM SERPL-SCNC: 4.4 MMOL/L (ref 3.5–5.5)
PROSTATE SPECIFIC ANTIGEN: 2.04 NG/ML
RBC: 4.63 M/UL (ref 3.8–5.8)
SODIUM BLD-SCNC: 140 MMOL/L (ref 133–145)
T4 FREE: 1.1 NG/DL (ref 0.9–1.8)
TOTAL PROTEIN: 7.4 G/DL (ref 6.2–8.1)
TRIGL SERPL-MCNC: 180 MG/DL (ref 40–149)
TSH SERPL DL<=0.05 MIU/L-ACNC: 1.62 MCU/ML (ref 0.27–4.2)
VLDLC SERPL CALC-MCNC: 36 MG/DL (ref 8–30)
WBC: 8.9 K/UL (ref 4–11)

## 2024-02-24 NOTE — PROGRESS NOTES
58 y.o. male who presents for evaluation.    We saw him in Jan at which time he had a viral syndrome that had been ongoing for several weeks, was actually seen in the [facility] system in Dec.  Most of his sx had cleared outside of cough; we gave him breo but they had insurance issues and ended up not getting it.  The sx have resolved for the most part, continues to use the flonase.      Also, at the time he was having a lot of cramps and requested to hold the lipitor.  Still having some cramps but less, would be willing to try a diff statin.     No cardiovascular complaints.  Remains active biking, weights, golfing. runs 3-4 mi at a time for 8 of 12 months usually, getting ready to start that back as his weight is up.  He likes being in the 180s, was in the 225 range previously. Admits that some of the weight is due to wine consumption     1/27/2023 8/11/2023 1/18/2024 3/1/2024   Vitals       Weight - Scale 195 lb  193 lb 12.8 oz  192 lb  194 lb      He is seeing Dr Fong for hormone treatment, currently on [place] thyroid.      He has madi followed by Dr Josue currently using a dental appliance as had mask issues    No gi or gu complaints.     Past Medical History:   Diagnosis Date    ADD (attention deficit disorder)     Allergic rhinitis     BPH (benign prostatic hyperplasia) 2022    NP [facility]    Colon adenomas 07/2014    and hyperplastic Dr Brian    Dyslipidemia     ED (erectile dysfunction)     Elevated PSA 2022    NP [facility]/[place]    FHx: prostate cancer     GERD (gastroesophageal reflux disease) 2003    egd Dr Brian    H/O cardiovascular stress test     NST, low risk. EF 59%. (7/16) Dr Avilez    Hypertension     Hypothyroidism     Dr Ruma Fong on [place] thyroid    IGT (impaired glucose tolerance) 08/2021    Insomnia     MADI (obstructive sleep apnea)     Dr RAKESH Josue; PLMD; intol mask, currently using dental applaince    Overweight (BMI 25.0-29.9)     Phymatous rosacea 01/2024    Renal cysts,

## 2024-03-01 ENCOUNTER — OFFICE VISIT (OUTPATIENT)
Age: 59
End: 2024-03-01
Payer: COMMERCIAL

## 2024-03-01 VITALS
HEART RATE: 82 BPM | HEIGHT: 71 IN | TEMPERATURE: 98.2 F | RESPIRATION RATE: 16 BRPM | BODY MASS INDEX: 27.16 KG/M2 | DIASTOLIC BLOOD PRESSURE: 80 MMHG | OXYGEN SATURATION: 98 % | SYSTOLIC BLOOD PRESSURE: 113 MMHG | WEIGHT: 194 LBS

## 2024-03-01 DIAGNOSIS — E66.3 OVERWEIGHT (BMI 25.0-29.9): ICD-10-CM

## 2024-03-01 DIAGNOSIS — R73.02 IGT (IMPAIRED GLUCOSE TOLERANCE): ICD-10-CM

## 2024-03-01 DIAGNOSIS — E78.5 DYSLIPIDEMIA: ICD-10-CM

## 2024-03-01 DIAGNOSIS — I10 PRIMARY HYPERTENSION: ICD-10-CM

## 2024-03-01 DIAGNOSIS — N52.9 ERECTILE DYSFUNCTION, UNSPECIFIED ERECTILE DYSFUNCTION TYPE: ICD-10-CM

## 2024-03-01 DIAGNOSIS — Z00.00 PHYSICAL EXAM: Primary | ICD-10-CM

## 2024-03-01 DIAGNOSIS — E03.9 HYPOTHYROIDISM, UNSPECIFIED TYPE: ICD-10-CM

## 2024-03-01 PROCEDURE — 99396 PREV VISIT EST AGE 40-64: CPT | Performed by: INTERNAL MEDICINE

## 2024-03-01 PROCEDURE — 3074F SYST BP LT 130 MM HG: CPT | Performed by: INTERNAL MEDICINE

## 2024-03-01 PROCEDURE — 3079F DIAST BP 80-89 MM HG: CPT | Performed by: INTERNAL MEDICINE

## 2024-03-01 RX ORDER — SILDENAFIL 50 MG/1
50 TABLET, FILM COATED ORAL PRN
Qty: 30 TABLET | Refills: 3 | Status: SHIPPED | OUTPATIENT
Start: 2024-03-01

## 2024-03-01 RX ORDER — THYROID 15 MG/1
15 TABLET ORAL DAILY
Qty: 30 TABLET | Refills: 3 | Status: SHIPPED | OUTPATIENT
Start: 2024-03-01

## 2024-03-01 RX ORDER — ROSUVASTATIN CALCIUM 5 MG/1
5 TABLET, COATED ORAL DAILY
Qty: 30 TABLET | Refills: 5 | Status: SHIPPED | OUTPATIENT
Start: 2024-03-01

## 2024-03-01 RX ORDER — TADALAFIL 20 MG/1
10 TABLET ORAL PRN
Qty: 45 TABLET | Refills: 2 | Status: SHIPPED | OUTPATIENT
Start: 2024-03-01

## 2024-03-01 ASSESSMENT — PATIENT HEALTH QUESTIONNAIRE - PHQ9
SUM OF ALL RESPONSES TO PHQ QUESTIONS 1-9: 0
1. LITTLE INTEREST OR PLEASURE IN DOING THINGS: 0
2. FEELING DOWN, DEPRESSED OR HOPELESS: 0
SUM OF ALL RESPONSES TO PHQ9 QUESTIONS 1 & 2: 0
SUM OF ALL RESPONSES TO PHQ QUESTIONS 1-9: 0

## 2024-03-01 NOTE — PROGRESS NOTES
Torin Peres presents today for   Chief Complaint   Patient presents with    Annual Exam     Patient is requesting brand name Alec Thyroid, ordering comes up generic.      \"Have you been to the ER, urgent care clinic since your last visit?  Hospitalized since your last visit?\"    NO    “Have you seen or consulted any other health care providers outside of [facility] since your last visit?”    NO

## 2024-06-20 ENCOUNTER — PATIENT MESSAGE (OUTPATIENT)
Facility: CLINIC | Age: 59
End: 2024-06-20

## 2024-06-21 NOTE — TELEPHONE ENCOUNTER
From: Torin Peres  To: Dr. Jersey Aviles  Sent: 6/20/2024 4:33 PM EDT  Subject: New Weegovy RX     Good afternoon. I just administered my third injection and messaging for the next RX with the new strength.    Also, my 24 year old son is moving back from [place] and is in need of an internist. He is pre diabetic and overweight. Would it be possible for you to be his internist? Appreciate anything you could do for him.    Thanks,  Abhijit Peres

## 2024-06-28 ENCOUNTER — TELEPHONE (OUTPATIENT)
Facility: CLINIC | Age: 59
End: 2024-06-28

## 2024-07-19 DIAGNOSIS — E03.9 HYPOTHYROIDISM, UNSPECIFIED TYPE: ICD-10-CM

## 2024-07-22 ENCOUNTER — PATIENT MESSAGE (OUTPATIENT)
Facility: CLINIC | Age: 59
End: 2024-07-22

## 2024-07-22 NOTE — TELEPHONE ENCOUNTER
From: Torin Peres  To: Dr. Jersey Aviles  Sent: 7/22/2024 6:53 AM EDT  Subject: Wee Govy    Good Morning,    I am doing my fourth injection tomorrow and need the new RX for wee govy.  Thanks,  Abhijit Peres

## 2024-07-24 RX ORDER — SEMAGLUTIDE 1 MG/.5ML
1 INJECTION, SOLUTION SUBCUTANEOUS
Qty: 2 ML | Refills: 0 | Status: SHIPPED | OUTPATIENT
Start: 2024-07-24

## 2024-07-24 RX ORDER — THYROID,PORK 15 MG
15 TABLET ORAL DAILY
Qty: 30 TABLET | Refills: 3 | Status: SHIPPED | OUTPATIENT
Start: 2024-07-24

## 2024-09-16 ENCOUNTER — PATIENT MESSAGE (OUTPATIENT)
Facility: CLINIC | Age: 59
End: 2024-09-16

## 2024-09-16 DIAGNOSIS — E66.3 OVERWEIGHT (BMI 25.0-29.9): ICD-10-CM

## 2024-09-17 DIAGNOSIS — E66.3 OVERWEIGHT (BMI 25.0-29.9): ICD-10-CM

## 2024-09-17 NOTE — TELEPHONE ENCOUNTER
Last Office visit:  5/31/24    Last Filled: 8/21/24    Follow up visit:  No future appointments.

## 2024-09-19 RX ORDER — SEMAGLUTIDE 1.7 MG/.75ML
INJECTION, SOLUTION SUBCUTANEOUS
Qty: 3 ML | OUTPATIENT
Start: 2024-09-19

## 2024-11-19 ENCOUNTER — HOSPITAL ENCOUNTER (OUTPATIENT)
Facility: HOSPITAL | Age: 59
Setting detail: SPECIMEN
Discharge: HOME | End: 2024-11-22

## 2024-11-19 LAB — SENTARA SPECIMEN COLLECTION: NORMAL

## 2024-11-19 PROCEDURE — 99001 SPECIMEN HANDLING PT-LAB: CPT

## 2024-11-21 LAB
A/G RATIO: 1.6 RATIO (ref 1.1–2.6)
ALBUMIN: 4.6 G/DL (ref 3.5–5)
ALDOLASE: 7.5 U/L
ALP BLD-CCNC: 92 U/L (ref 40–125)
ALT SERPL-CCNC: 22 U/L (ref 5–40)
ANION GAP SERPL CALCULATED.3IONS-SCNC: 11 MMOL/L (ref 3–15)
AST SERPL-CCNC: 21 U/L (ref 10–37)
BASOPHILS ABSOLUTE: 0.1 K/UL (ref 0–0.2)
BASOPHILS RELATIVE PERCENT: 0 % (ref 0–2)
BILIRUB SERPL-MCNC: 0.5 MG/DL (ref 0.2–1.2)
BUN BLDV-MCNC: 21 MG/DL (ref 6–22)
CALCIUM SERPL-MCNC: 9.5 MG/DL (ref 8.4–10.5)
CHLORIDE BLD-SCNC: 99 MMOL/L (ref 98–110)
CHOLESTEROL, TOTAL: 168 MG/DL (ref 110–200)
CHOLESTEROL/HDL RATIO: 5.4 (ref 0–5)
CO2: 28 MMOL/L (ref 20–32)
CREAT SERPL-MCNC: 1.3 MG/DL (ref 0.8–1.6)
EOSINOPHIL # BLD: 2 % (ref 0–6)
EOSINOPHILS ABSOLUTE: 0.2 K/UL (ref 0–0.5)
GFR, ESTIMATED: >60 ML/MIN/1.73 SQ.M.
GLOBULIN: 2.8 G/DL (ref 2–4)
GLUCOSE: 91 MG/DL (ref 70–99)
HCT VFR BLD CALC: 48.1 % (ref 39.3–51.6)
HDLC SERPL-MCNC: 31 MG/DL
HEMOGLOBIN: 15.1 G/DL (ref 13.1–17.2)
LYMPHOCYTES # BLD: 40 % (ref 20–45)
LYMPHOCYTES ABSOLUTE: 4.7 K/UL (ref 1–4.8)
MCH RBC QN AUTO: 31 PG (ref 26–34)
MCHC RBC AUTO-ENTMCNC: 31 G/DL (ref 31–36)
MCV RBC AUTO: 98 FL (ref 80–95)
MONOCYTES ABSOLUTE: 1 K/UL (ref 0.1–1)
MONOCYTES: 9 % (ref 3–12)
NEUTROPHILS ABSOLUTE: 5.7 K/UL (ref 1.8–7.7)
NEUTROPHILS: 49 % (ref 40–75)
PDW BLD-RTO: 13.7 % (ref 10–15.5)
PLATELET # BLD: 272 K/UL (ref 140–440)
PMV BLD AUTO: 11.9 FL (ref 9–13)
POTASSIUM SERPL-SCNC: 4.3 MMOL/L (ref 3.5–5.5)
RBC # BLD: 4.9 M/UL (ref 3.8–5.8)
SODIUM BLD-SCNC: 138 MMOL/L (ref 133–145)
TOTAL CK: 66 U/L (ref 30–200)
TOTAL PROTEIN: 7.4 G/DL (ref 6.2–8.1)
WBC # BLD: 11.7 K/UL (ref 4–11)

## 2024-11-21 NOTE — PROGRESS NOTES
59 y.o. male who presents for evaluation.    He had problems with 2 diff statins so we started zetia and no sfx thus far    He also requested to get on wegovy to get off the last stubborn 20 lbs or so that he was trying to lose.  Doing fine on max dosing and weight down as below     1/18/2024 3/1/2024 5/31/2024 11/27/2024   Vitals       Weight - Scale 192 lb  194 lb  195 lb  171 lb      No cardiovascular complaints.  Remains active biking, weights, golfing, runs 3-4 mi at a time for 8 of 12 months usually.  He's cut back on bread/carbs/etoh    He was seeing Dr Fong for hormone treatment, currently on [facility]. Wants testosterone levels checked at the next draw, was previously on replacement as well?     He has madi followed by Dr Josue currently using a dental appliance as had mask issues    No gi or gu complaints.     Past Medical History:   Diagnosis Date    ADD (attention deficit disorder)     Allergic rhinitis     BPH (benign prostatic hyperplasia) 2022    NP [facility]    Colon adenomas 07/2014    and hyperplastic Dr Brian    Dyslipidemia     ED (erectile dysfunction)     Elevated PSA 2022    NP [facility]    FHx: prostate cancer     GERD (gastroesophageal reflux disease) 2003    egd Dr Brian    H/O cardiovascular stress test     NST, low risk. EF 59%. (7/16) Dr Avilez    Hypertension     Hypothyroidism     Dr Ruma Fong on [facility]    IGT (impaired glucose tolerance) 08/2021    Insomnia     MADI (obstructive sleep apnea)     Dr RAKESH Josue; [facility]; intol mask, currently using dental applaince    Overweight (BMI 25.0-29.9)     wegovy (5/25) start wt 195 lbs    Phymatous rosacea 01/2024    Renal cysts, acquired, bilateral     SBO (small bowel obstruction) (HCC) 12/2014     Past Surgical History:   Procedure Laterality Date    COLONOSCOPY      Dr Brian (12/5/14) adenoma and ileitis; (9/11/20) neg    GI      Dr Brian egd (2003); (2/15); (9/20)    HEENT  2003    mandible osteotomy [facility]

## 2024-11-27 ENCOUNTER — OFFICE VISIT (OUTPATIENT)
Facility: CLINIC | Age: 59
End: 2024-11-27
Payer: COMMERCIAL

## 2024-11-27 VITALS
SYSTOLIC BLOOD PRESSURE: 120 MMHG | HEIGHT: 71 IN | WEIGHT: 171 LBS | OXYGEN SATURATION: 100 % | TEMPERATURE: 98.5 F | DIASTOLIC BLOOD PRESSURE: 85 MMHG | RESPIRATION RATE: 16 BRPM | HEART RATE: 74 BPM | BODY MASS INDEX: 23.94 KG/M2

## 2024-11-27 DIAGNOSIS — E66.3 OVERWEIGHT (BMI 25.0-29.9): ICD-10-CM

## 2024-11-27 DIAGNOSIS — Z12.5 SCREENING FOR MALIGNANT NEOPLASM OF PROSTATE: ICD-10-CM

## 2024-11-27 DIAGNOSIS — E03.9 HYPOTHYROIDISM, UNSPECIFIED TYPE: ICD-10-CM

## 2024-11-27 DIAGNOSIS — E78.5 DYSLIPIDEMIA: ICD-10-CM

## 2024-11-27 DIAGNOSIS — I10 PRIMARY HYPERTENSION: Primary | ICD-10-CM

## 2024-11-27 DIAGNOSIS — N40.0 BENIGN PROSTATIC HYPERPLASIA, UNSPECIFIED WHETHER LOWER URINARY TRACT SYMPTOMS PRESENT: ICD-10-CM

## 2024-11-27 DIAGNOSIS — R73.02 IGT (IMPAIRED GLUCOSE TOLERANCE): ICD-10-CM

## 2024-11-27 DIAGNOSIS — G47.33 OSA (OBSTRUCTIVE SLEEP APNEA): ICD-10-CM

## 2024-11-27 PROCEDURE — 3074F SYST BP LT 130 MM HG: CPT | Performed by: INTERNAL MEDICINE

## 2024-11-27 PROCEDURE — 3079F DIAST BP 80-89 MM HG: CPT | Performed by: INTERNAL MEDICINE

## 2024-11-27 PROCEDURE — 99214 OFFICE O/P EST MOD 30 MIN: CPT | Performed by: INTERNAL MEDICINE

## 2024-11-27 NOTE — PROGRESS NOTES
Torin Peres presents today for   Chief Complaint   Patient presents with    6 Month Follow-Up    Hypertension    Discuss Labs       \"Have you been to the ER, urgent care clinic since your last visit?  Hospitalized since your last visit?\"    NO    “Have you seen or consulted any other health care providers outside of [facility] since your last visit?”    NO

## 2024-12-09 ENCOUNTER — TELEPHONE (OUTPATIENT)
Facility: CLINIC | Age: 59
End: 2024-12-09

## 2024-12-09 DIAGNOSIS — E78.5 DYSLIPIDEMIA: ICD-10-CM

## 2024-12-09 RX ORDER — EZETIMIBE 10 MG/1
10 TABLET ORAL DAILY
Qty: 90 TABLET | Refills: 3 | Status: SHIPPED | OUTPATIENT
Start: 2024-12-09

## 2024-12-09 NOTE — TELEPHONE ENCOUNTER
Refill request via fax     Medication: ezetimibe (ZETIA) 10 MG tablet   Quantity: 30  Pharmacy: [id number] [address] [place] [address]   Last Fill: 5/31/2024    PCP: Jersey Aviles MD    LAST OFFICE VISIT: 11/27/24    Future Appointments   Date Time Provider Department Center   5/30/2025  8:20 AM Jersey Aviles MD [facility]

## 2024-12-30 ENCOUNTER — TELEPHONE (OUTPATIENT)
Facility: CLINIC | Age: 59
End: 2024-12-30

## 2024-12-30 NOTE — TELEPHONE ENCOUNTER
Esme with [employer] called about an authorization request that was denied we sent for this patients Wegovy. She said the member ID attached to the request was inaccurate the current member ID is: [id number].    She also said his current [employer] plan expires tomorrow and they do not have him enrolled in any benefits at the beginning of the year.     In order for them to approve this request we would need to withdrawal current request, submit with new member ID: [id number] and submit today.         Reyna-

## 2025-02-06 DIAGNOSIS — K21.9 GASTROESOPHAGEAL REFLUX DISEASE, UNSPECIFIED WHETHER ESOPHAGITIS PRESENT: ICD-10-CM

## 2025-02-07 RX ORDER — PANTOPRAZOLE SODIUM 20 MG/1
20 TABLET, DELAYED RELEASE ORAL DAILY
Qty: 90 TABLET | Refills: 3 | Status: SHIPPED | OUTPATIENT
Start: 2025-02-07

## 2025-02-08 DIAGNOSIS — I10 PRIMARY HYPERTENSION: ICD-10-CM

## 2025-02-10 DIAGNOSIS — N52.9 ERECTILE DYSFUNCTION, UNSPECIFIED ERECTILE DYSFUNCTION TYPE: ICD-10-CM

## 2025-02-11 DIAGNOSIS — E03.9 HYPOTHYROIDISM, UNSPECIFIED TYPE: ICD-10-CM

## 2025-02-11 RX ORDER — HYDROCHLOROTHIAZIDE 12.5 MG/1
12.5 TABLET ORAL DAILY
Qty: 90 TABLET | Refills: 3 | Status: SHIPPED | OUTPATIENT
Start: 2025-02-11

## 2025-02-13 RX ORDER — THYROID,PORK 15 MG
15 TABLET ORAL DAILY
Qty: 90 TABLET | Refills: 3 | Status: SHIPPED | OUTPATIENT
Start: 2025-02-13

## 2025-02-13 RX ORDER — TADALAFIL 20 MG/1
10 TABLET ORAL PRN
Qty: 45 TABLET | Refills: 2 | Status: SHIPPED | OUTPATIENT
Start: 2025-02-13

## 2025-03-03 ENCOUNTER — PATIENT MESSAGE (OUTPATIENT)
Facility: CLINIC | Age: 60
End: 2025-03-03

## 2025-03-03 NOTE — TELEPHONE ENCOUNTER
Per patient message:  Good morning.  Someone in the medical field noticed that I had these marks on my finger nails and suggested I get it looked at it as it could be a sign of heavy metals ingested of some kind.  Thoughts??  Thanks,  Abhijit Peres    Please review and advise.

## 2025-05-01 NOTE — TELEPHONE ENCOUNTER
done Pt is unreliable historian. Pt stated to reside in 1st floor apt using no stairs to negotiate. Pt used to be independent with overall functional mobility, able to ambulate using No AD at baseline

## 2025-05-19 NOTE — PROGRESS NOTES
daily    Semaglutide-Weight Management (WEGOVY) 2.4 MG/0.75ML SOAJ SC injection Inject 2.4 mg into the skin every 7 days    sildenafil (VIAGRA) 50 MG tablet Take 1 tablet by mouth as needed for Erectile Dysfunction     No current facility-administered medications for this visit.     Allergies   Allergen Reactions    Rosuvastatin Myalgia    Lipitor [Atorvastatin] Myalgia     Vitals:    05/30/25 0858   BP: 125/83   Pulse: 75   Resp: 16   Temp: 98.7 °F (37.1 °C)   TempSrc: Temporal   SpO2: 98%   Weight: 80.3 kg (177 lb)   Height: 1.803 m (5' 11\")   A&O x3  Affect is appropriate.  Mood stable  No apparent distress  Anicteric, no JVD, adenopathy or thyromegaly.   No carotid bruits or radiated murmur  Lungs clear to auscultation, no wheezes or rales  Heart showed regular rate and rhythm. No murmur, rubs, gallops  Abdomen soft nontender, no hepatosplenomegaly or masses.   Extremities without edema.  Pulses 1-2+ symmetrically    LABS  From 3/18 showed gluc 94,   cr 1.23, gfr 66, alt 20, hba1c 5.4, chol 146, tg 132, hdl 37, ldl-c 82,    wbc 8.4, hb 15.2, plt 259, psa 1.40, vit d 32.4, ua neg, tsh 1.75, test 456, uric 7.8  From 1/20 showed gluc 72,   cr 1.10, gfr>60,                          test 385  From 9/20 showed                       psa 1.32  From 8/21 showed gluc 87,   cr 1.20, gfr>60, alt 29, hba1c 5.8,                 psa 2.32  From 6/22 showed                       psa 2.32, vit d 185,            b12 1431  From 7/22 showed gluc 94,   cr 1.20, gfr>60, alt 18, hba1c 5.8, chol 145, tg 120, hdl 33, ldl-c 82,   wbc 8.2, hb 14.2, plt 235,              vit d 111,        tsh 1.79, test 335, ft4 1.10  From 1/23 showed gluc 93,   cr 1.20, gfr>60, alt 17, hba1c 5.6, chol 141, tg 134, hdl 38, ldl-c 76,   wbc 9.4, hb 14.3, plt 243, psa 2.27  From 1/24 showed gluc 90,   cr 1.20, gfr>60, alt 46,                   ast 38, ap 98, tb 0.7, ck 128, ana cristina 8.6  From 3/24 showed gluc 100, cr 1.10, gfr>60, alt 19, hba1c 5.3, chol 195, tg

## 2025-05-27 DIAGNOSIS — Z12.5 SCREENING FOR MALIGNANT NEOPLASM OF PROSTATE: ICD-10-CM

## 2025-05-27 DIAGNOSIS — I10 PRIMARY HYPERTENSION: ICD-10-CM

## 2025-05-27 DIAGNOSIS — N40.0 BENIGN PROSTATIC HYPERPLASIA, UNSPECIFIED WHETHER LOWER URINARY TRACT SYMPTOMS PRESENT: ICD-10-CM

## 2025-05-27 DIAGNOSIS — E03.9 HYPOTHYROIDISM, UNSPECIFIED TYPE: ICD-10-CM

## 2025-05-27 DIAGNOSIS — E78.5 DYSLIPIDEMIA: ICD-10-CM

## 2025-05-30 ENCOUNTER — OFFICE VISIT (OUTPATIENT)
Facility: CLINIC | Age: 60
End: 2025-05-30
Payer: COMMERCIAL

## 2025-05-30 VITALS
TEMPERATURE: 98.7 F | RESPIRATION RATE: 16 BRPM | HEIGHT: 71 IN | BODY MASS INDEX: 24.78 KG/M2 | SYSTOLIC BLOOD PRESSURE: 125 MMHG | WEIGHT: 177 LBS | HEART RATE: 75 BPM | OXYGEN SATURATION: 98 % | DIASTOLIC BLOOD PRESSURE: 83 MMHG

## 2025-05-30 DIAGNOSIS — E78.5 DYSLIPIDEMIA: ICD-10-CM

## 2025-05-30 DIAGNOSIS — G47.33 OSA (OBSTRUCTIVE SLEEP APNEA): ICD-10-CM

## 2025-05-30 DIAGNOSIS — E03.9 HYPOTHYROIDISM, UNSPECIFIED TYPE: ICD-10-CM

## 2025-05-30 DIAGNOSIS — I10 PRIMARY HYPERTENSION: Primary | ICD-10-CM

## 2025-05-30 DIAGNOSIS — N40.0 BENIGN PROSTATIC HYPERPLASIA, UNSPECIFIED WHETHER LOWER URINARY TRACT SYMPTOMS PRESENT: ICD-10-CM

## 2025-05-30 DIAGNOSIS — R73.02 IGT (IMPAIRED GLUCOSE TOLERANCE): ICD-10-CM

## 2025-05-30 PROCEDURE — 3079F DIAST BP 80-89 MM HG: CPT | Performed by: INTERNAL MEDICINE

## 2025-05-30 PROCEDURE — 99214 OFFICE O/P EST MOD 30 MIN: CPT | Performed by: INTERNAL MEDICINE

## 2025-05-30 PROCEDURE — 3074F SYST BP LT 130 MM HG: CPT | Performed by: INTERNAL MEDICINE

## 2025-05-30 SDOH — ECONOMIC STABILITY: FOOD INSECURITY: WITHIN THE PAST 12 MONTHS, YOU WORRIED THAT YOUR FOOD WOULD RUN OUT BEFORE YOU GOT MONEY TO BUY MORE.: NEVER TRUE

## 2025-05-30 SDOH — ECONOMIC STABILITY: FOOD INSECURITY: WITHIN THE PAST 12 MONTHS, THE FOOD YOU BOUGHT JUST DIDN'T LAST AND YOU DIDN'T HAVE MONEY TO GET MORE.: NEVER TRUE

## 2025-05-30 ASSESSMENT — PATIENT HEALTH QUESTIONNAIRE - PHQ9
1. LITTLE INTEREST OR PLEASURE IN DOING THINGS: NOT AT ALL
SUM OF ALL RESPONSES TO PHQ QUESTIONS 1-9: 0
SUM OF ALL RESPONSES TO PHQ QUESTIONS 1-9: 0
2. FEELING DOWN, DEPRESSED OR HOPELESS: NOT AT ALL
SUM OF ALL RESPONSES TO PHQ QUESTIONS 1-9: 0
SUM OF ALL RESPONSES TO PHQ QUESTIONS 1-9: 0

## 2025-06-20 ENCOUNTER — HOSPITAL ENCOUNTER (OUTPATIENT)
Age: 60
Discharge: HOME | End: 2025-06-20
Payer: COMMERCIAL

## 2025-06-20 LAB
ALBUMIN SERPL-MCNC: 3.9 G/DL (ref 3.4–5)
ALBUMIN/GLOB SERPL: 1.1 (ref 0.8–1.7)
ALP SERPL-CCNC: 86 U/L (ref 45–117)
ALT SERPL-CCNC: 18 U/L (ref 10–50)
ANION GAP SERPL CALC-SCNC: 13 MMOL/L (ref 3–18)
AST SERPL-CCNC: 24 U/L (ref 10–38)
BASOPHILS # BLD: 0.06 K/UL (ref 0–0.1)
BASOPHILS NFR BLD: 0.6 % (ref 0–2)
BILIRUB SERPL-MCNC: 0.4 MG/DL (ref 0.2–1)
BUN SERPL-MCNC: 25 MG/DL (ref 6–23)
BUN/CREAT SERPL: 19 (ref 12–20)
CALCIUM SERPL-MCNC: 10.5 MG/DL (ref 8.5–10.1)
CHLORIDE SERPL-SCNC: 101 MMOL/L (ref 98–107)
CHOLEST SERPL-MCNC: 180 MG/DL
CO2 SERPL-SCNC: 27 MMOL/L (ref 21–32)
CREAT SERPL-MCNC: 1.31 MG/DL (ref 0.6–1.3)
DIFFERENTIAL METHOD BLD: ABNORMAL
EOSINOPHIL # BLD: 0.23 K/UL (ref 0–0.4)
EOSINOPHIL NFR BLD: 2.4 % (ref 0–5)
ERYTHROCYTE [DISTWIDTH] IN BLOOD BY AUTOMATED COUNT: 13.3 % (ref 11.6–14.5)
GLOBULIN SER CALC-MCNC: 3.6 G/DL (ref 2–4)
GLUCOSE SERPL-MCNC: 83 MG/DL (ref 74–108)
HCT VFR BLD AUTO: 51.6 % (ref 36–48)
HDLC SERPL-MCNC: 37 MG/DL (ref 40–60)
HDLC SERPL: 4.9 (ref 0–5)
HGB BLD-MCNC: 16.3 G/DL (ref 13–16)
IMM GRANULOCYTES # BLD AUTO: 0.05 K/UL (ref 0–0.04)
IMM GRANULOCYTES NFR BLD AUTO: 0.5 % (ref 0–0.5)
LDLC SERPL CALC-MCNC: 115 MG/DL (ref 0–100)
LYMPHOCYTES # BLD: 3.16 K/UL (ref 0.9–3.6)
LYMPHOCYTES NFR BLD: 32.6 % (ref 21–52)
MCH RBC QN AUTO: 28.9 PG (ref 24–34)
MCHC RBC AUTO-ENTMCNC: 31.6 G/DL (ref 31–37)
MCV RBC AUTO: 91.5 FL (ref 78–100)
MONOCYTES # BLD: 1.09 K/UL (ref 0.05–1.2)
MONOCYTES NFR BLD: 11.2 % (ref 3–10)
NEUTS SEG # BLD: 5.1 K/UL (ref 1.8–8)
NEUTS SEG NFR BLD: 52.7 % (ref 40–73)
NRBC # BLD: 0 K/UL (ref 0–0.01)
NRBC BLD-RTO: 0 PER 100 WBC
PLATELET # BLD AUTO: 241 K/UL (ref 135–420)
PMV BLD AUTO: 11.3 FL (ref 9.2–11.8)
POTASSIUM SERPL-SCNC: 5.1 MMOL/L (ref 3.5–5.5)
PROT SERPL-MCNC: 7.5 G/DL (ref 6.4–8.2)
PSA SERPL-MCNC: 3.5 NG/ML (ref 1.4–4.4)
RBC # BLD AUTO: 5.64 M/UL (ref 4.35–5.65)
SODIUM SERPL-SCNC: 141 MMOL/L (ref 136–145)
T4 FREE SERPL-MCNC: 1.2 NG/DL (ref 0.9–1.7)
TRIGL SERPL-MCNC: 142 MG/DL (ref 0–150)
TSH, 3RD GENERATION: 1.44 UIU/ML (ref 0.27–4.2)
VLDLC SERPL CALC-MCNC: 28 MG/DL
WBC # BLD AUTO: 9.7 K/UL (ref 4.6–13.2)

## 2025-06-20 PROCEDURE — G0103 PSA SCREENING: HCPCS

## 2025-06-20 PROCEDURE — 80053 COMPREHEN METABOLIC PANEL: CPT

## 2025-06-20 PROCEDURE — 84439 ASSAY OF FREE THYROXINE: CPT

## 2025-06-20 PROCEDURE — 84443 ASSAY THYROID STIM HORMONE: CPT

## 2025-06-20 PROCEDURE — 84403 ASSAY OF TOTAL TESTOSTERONE: CPT

## 2025-06-20 PROCEDURE — 84402 ASSAY OF FREE TESTOSTERONE: CPT

## 2025-06-20 PROCEDURE — 36415 COLL VENOUS BLD VENIPUNCTURE: CPT

## 2025-06-20 PROCEDURE — 85025 COMPLETE CBC W/AUTO DIFF WBC: CPT

## 2025-06-20 PROCEDURE — 80061 LIPID PANEL: CPT

## 2025-06-21 LAB
TESTOST FREE SERPL-MCNC: 12.6 PG/ML (ref 6.6–18.1)
TESTOST SERPL-MCNC: 584 NG/DL (ref 264–916)

## 2025-08-22 ENCOUNTER — HOSPITAL ENCOUNTER (OUTPATIENT)
Age: 60
Discharge: HOME | End: 2025-08-22
Payer: COMMERCIAL

## 2025-08-22 DIAGNOSIS — E29.1 HYPOGONADISM IN MALE: ICD-10-CM

## 2025-08-22 DIAGNOSIS — R79.89 ELEVATED SERUM CREATININE: ICD-10-CM

## 2025-08-22 DIAGNOSIS — E78.5 DYSLIPIDEMIA: ICD-10-CM

## 2025-08-22 DIAGNOSIS — R97.20 RISING PSA LEVEL: ICD-10-CM

## 2025-08-22 LAB
ALBUMIN SERPL-MCNC: 4 G/DL (ref 3.4–5)
ALBUMIN/GLOB SERPL: 1.1 (ref 0.8–1.7)
ALP SERPL-CCNC: 90 U/L (ref 45–117)
ALT SERPL-CCNC: 15 U/L (ref 10–50)
ANION GAP SERPL CALC-SCNC: 11 MMOL/L (ref 3–18)
AST SERPL-CCNC: 20 U/L (ref 10–38)
BASOPHILS # BLD: 0.07 K/UL (ref 0–0.1)
BASOPHILS NFR BLD: 0.6 % (ref 0–2)
BILIRUB SERPL-MCNC: 0.8 MG/DL (ref 0.2–1)
BUN SERPL-MCNC: 18 MG/DL (ref 6–23)
BUN/CREAT SERPL: 14 (ref 12–20)
CALCIUM SERPL-MCNC: 10.4 MG/DL (ref 8.5–10.1)
CHLORIDE SERPL-SCNC: 99 MMOL/L (ref 98–107)
CHOLEST SERPL-MCNC: 184 MG/DL
CO2 SERPL-SCNC: 28 MMOL/L (ref 21–32)
CREAT SERPL-MCNC: 1.32 MG/DL (ref 0.6–1.3)
DIFFERENTIAL METHOD BLD: ABNORMAL
EOSINOPHIL # BLD: 0.24 K/UL (ref 0–0.4)
EOSINOPHIL NFR BLD: 1.9 % (ref 0–5)
ERYTHROCYTE [DISTWIDTH] IN BLOOD BY AUTOMATED COUNT: 13.7 % (ref 11.6–14.5)
GLOBULIN SER CALC-MCNC: 3.6 G/DL (ref 2–4)
GLUCOSE SERPL-MCNC: 101 MG/DL (ref 74–108)
HCT VFR BLD AUTO: 50.3 % (ref 36–48)
HDLC SERPL-MCNC: 36 MG/DL (ref 40–60)
HDLC SERPL: 5.1 (ref 0–5)
HGB BLD-MCNC: 16 G/DL (ref 13–16)
IMM GRANULOCYTES # BLD AUTO: 0.04 K/UL (ref 0–0.04)
IMM GRANULOCYTES NFR BLD AUTO: 0.3 % (ref 0–0.5)
LDLC SERPL CALC-MCNC: 115 MG/DL (ref 0–100)
LYMPHOCYTES # BLD: 3.41 K/UL (ref 0.9–3.6)
LYMPHOCYTES NFR BLD: 27.2 % (ref 21–52)
MCH RBC QN AUTO: 28.5 PG (ref 24–34)
MCHC RBC AUTO-ENTMCNC: 31.8 G/DL (ref 31–37)
MCV RBC AUTO: 89.7 FL (ref 78–100)
MONOCYTES # BLD: 1.12 K/UL (ref 0.05–1.2)
MONOCYTES NFR BLD: 8.9 % (ref 3–10)
NEUTS SEG # BLD: 7.65 K/UL (ref 1.8–8)
NEUTS SEG NFR BLD: 61.1 % (ref 40–73)
NRBC # BLD: 0 K/UL (ref 0–0.01)
NRBC BLD-RTO: 0 PER 100 WBC
PLATELET # BLD AUTO: 281 K/UL (ref 135–420)
PMV BLD AUTO: 11.6 FL (ref 9.2–11.8)
POTASSIUM SERPL-SCNC: 4.8 MMOL/L (ref 3.5–5.5)
PROT SERPL-MCNC: 7.7 G/DL (ref 6.4–8.2)
PSA SERPL-MCNC: 2.9 NG/ML (ref 1.4–4.4)
RBC # BLD AUTO: 5.61 M/UL (ref 4.35–5.65)
SODIUM SERPL-SCNC: 137 MMOL/L (ref 136–145)
TRIGL SERPL-MCNC: 164 MG/DL (ref 0–150)
VLDLC SERPL CALC-MCNC: 33 MG/DL
WBC # BLD AUTO: 12.5 K/UL (ref 4.6–13.2)

## 2025-08-22 PROCEDURE — 80061 LIPID PANEL: CPT

## 2025-08-22 PROCEDURE — 36415 COLL VENOUS BLD VENIPUNCTURE: CPT

## 2025-08-22 PROCEDURE — 84153 ASSAY OF PSA TOTAL: CPT

## 2025-08-22 PROCEDURE — 85025 COMPLETE CBC W/AUTO DIFF WBC: CPT

## 2025-08-22 PROCEDURE — 80053 COMPREHEN METABOLIC PANEL: CPT
